# Patient Record
Sex: MALE | Race: WHITE | NOT HISPANIC OR LATINO | Employment: FULL TIME | URBAN - NONMETROPOLITAN AREA
[De-identification: names, ages, dates, MRNs, and addresses within clinical notes are randomized per-mention and may not be internally consistent; named-entity substitution may affect disease eponyms.]

---

## 2019-09-30 ENCOUNTER — OFFICE VISIT (OUTPATIENT)
Dept: FAMILY MEDICINE CLINIC | Facility: CLINIC | Age: 54
End: 2019-09-30
Payer: COMMERCIAL

## 2019-09-30 VITALS
HEIGHT: 75 IN | DIASTOLIC BLOOD PRESSURE: 84 MMHG | SYSTOLIC BLOOD PRESSURE: 134 MMHG | BODY MASS INDEX: 20.76 KG/M2 | WEIGHT: 167 LBS

## 2019-09-30 DIAGNOSIS — N52.9 VASCULOGENIC ERECTILE DYSFUNCTION, UNSPECIFIED VASCULOGENIC ERECTILE DYSFUNCTION TYPE: ICD-10-CM

## 2019-09-30 DIAGNOSIS — J01.00 ACUTE NON-RECURRENT MAXILLARY SINUSITIS: Primary | ICD-10-CM

## 2019-09-30 PROCEDURE — 99203 OFFICE O/P NEW LOW 30 MIN: CPT | Performed by: FAMILY MEDICINE

## 2019-09-30 RX ORDER — SILDENAFIL 50 MG/1
50 TABLET, FILM COATED ORAL DAILY PRN
Qty: 10 TABLET | Refills: 0 | Status: SHIPPED | OUTPATIENT
Start: 2019-09-30 | End: 2019-11-01 | Stop reason: SDUPTHER

## 2019-09-30 RX ORDER — AMOXICILLIN AND CLAVULANATE POTASSIUM 875; 125 MG/1; MG/1
1 TABLET, FILM COATED ORAL EVERY 12 HOURS SCHEDULED
Qty: 20 TABLET | Refills: 0 | Status: SHIPPED | OUTPATIENT
Start: 2019-09-30 | End: 2019-10-10

## 2019-09-30 NOTE — PATIENT INSTRUCTIONS
Discussed the problem with the congestion I feel has maxillary sinusitis and will place on 10 day course of Augmentin 875 twice a day  Should use saline nasal rinse at bedtime and in the morning    Discussed the idea of using generic Viagra for the erectile dysfunction and may try using 50 mg and I sent a prescription in for this but may also try taking DHEA 50 or 100 mg daily for 6-8 week trial   Should set up in the future for wellness check

## 2019-09-30 NOTE — PROGRESS NOTES
Assessment/Plan:      Diagnoses and all orders for this visit:    Acute non-recurrent maxillary sinusitis  Comments:  Should avoid smoking  Will place on 10 day course of Augmentin 875 b i d  And use saline nasal rinse twice a day  Orders:  -     amoxicillin-clavulanate (AUGMENTIN) 875-125 mg per tablet; Take 1 tablet by mouth every 12 (twelve) hours for 10 days    Vasculogenic erectile dysfunction, unspecified vasculogenic erectile dysfunction type  Comments:  Gave prescription for generic Viagra to use p r n  But should also use DHEA 50 or 100 mg daily for 6-8 week trial  Orders:  -     sildenafil (VIAGRA) 50 MG tablet; Take 1 tablet (50 mg total) by mouth daily as needed for erectile dysfunction          Subjective:     Patient ID: Huy Hogan is a 52 y o  male  Patient has history of rectal carcinoma is currently being followed for this but treatment has finished  Has 2 week history of cough and congestion now with discolored mucus and has been having trouble with erectile dysfunction only occasionally has erections in the morning      Review of Systems   HENT: Positive for congestion and rhinorrhea (Mucus is discolored)  Respiratory: Negative for chest tightness and wheezing  Gastrointestinal: Negative for abdominal pain  Endocrine: Negative for polyuria  Genitourinary: Negative for difficulty urinating  Neurological: Positive for light-headedness  Negative for dizziness and headaches  Hematological: Negative for adenopathy  Objective:     Physical Exam   Constitutional: He is oriented to person, place, and time  He appears well-developed  HENT:   Head: Normocephalic  Right Ear: Tympanic membrane, external ear and ear canal normal    Left Ear: Tympanic membrane, external ear and ear canal normal    Nose: Mucosal edema and rhinorrhea ( mucus is discolored) present  Mouth/Throat: Posterior oropharyngeal erythema present  Eyes: Conjunctivae are normal    Neck: Neck supple  No thyromegaly present  Cardiovascular: Normal rate, regular rhythm, normal heart sounds and intact distal pulses  No murmur (Rate is 78) heard  Pulmonary/Chest: Effort normal and breath sounds normal    Abdominal: Soft  He exhibits no distension and no mass  There is no tenderness  Musculoskeletal: He exhibits no edema  Lymphadenopathy:     He has no cervical adenopathy  Neurological: He is alert and oriented to person, place, and time  Skin: Skin is warm and dry  No rash noted  Psychiatric: He has a normal mood and affect  Nursing note and vitals reviewed

## 2019-10-01 PROBLEM — C20 RECTAL CARCINOMA (HCC): Chronic | Status: ACTIVE | Noted: 2019-10-01

## 2019-10-31 ENCOUNTER — OFFICE VISIT (OUTPATIENT)
Dept: FAMILY MEDICINE CLINIC | Facility: CLINIC | Age: 54
End: 2019-10-31
Payer: COMMERCIAL

## 2019-10-31 VITALS
SYSTOLIC BLOOD PRESSURE: 138 MMHG | HEIGHT: 75 IN | BODY MASS INDEX: 20.64 KG/M2 | WEIGHT: 166 LBS | DIASTOLIC BLOOD PRESSURE: 88 MMHG

## 2019-10-31 DIAGNOSIS — Z00.00 WELLNESS EXAMINATION: Primary | ICD-10-CM

## 2019-10-31 DIAGNOSIS — N52.9 VASCULOGENIC ERECTILE DYSFUNCTION, UNSPECIFIED VASCULOGENIC ERECTILE DYSFUNCTION TYPE: ICD-10-CM

## 2019-10-31 DIAGNOSIS — C20 RECTAL CARCINOMA (HCC): Chronic | ICD-10-CM

## 2019-10-31 PROCEDURE — 3008F BODY MASS INDEX DOCD: CPT | Performed by: FAMILY MEDICINE

## 2019-10-31 PROCEDURE — 99396 PREV VISIT EST AGE 40-64: CPT | Performed by: FAMILY MEDICINE

## 2019-10-31 PROCEDURE — 99406 BEHAV CHNG SMOKING 3-10 MIN: CPT | Performed by: FAMILY MEDICINE

## 2019-10-31 NOTE — PATIENT INSTRUCTIONS
Overall exam today looks stable I did discuss the smoking which is about 1 pack a day and needs to consider stopping and I advised him to try to every time he goes to light up a cigarette to ask himself due I really want this 1 and if not avoid that cigarette  If decides would like to pursue stopping may call and will recheck and consider use of medication to help    I did advise flu shot patient declined will check renal panel lipid panel and PSA

## 2019-10-31 NOTE — PROGRESS NOTES
[address]  [facility]    NAME: Jeanne Arias  AGE: 62 y o  SEX: male  : 1965     DATE: 2019     Assessment and Plan:     Problem List Items Addressed This Visit        Digestive    Rectal carcinoma ([address] ) (Chronic)     Reviewed history I feel has abnormalities related to his prior surgery and would be helped using Metamucil 1 tbsp daily with his bowel movements            Other    Vasculogenic erectile dysfunction (Chronic)     Overall doing better should continue the DHEA and use the generic Viagra p r n  Relevant Medications    sildenafil (VIAGRA) 50 MG tablet      Other Visit Diagnoses     Wellness examination    -  Primary    Relevant Orders    Renal function panel    Lipid panel    PSA, Total Screen          Immunizations and preventive care screenings were discussed with patient today  Appropriate education was printed on patient's after visit summary  Counseling:  · Needs to check in with dentist   I did discussed adjusting overall diet and needs to push for smoking cessation      Tobacco Cessation Counseling: Tobacco cessation counseling was provided  The patient is sincerely urged to quit consumption of tobacco  He is not ready to quit tobacco  Medication options discussed  Side effects of medication not discussed  Return in about 1 year (around 10/31/2020) for Annual physical      Chief Complaint:     Chief Complaint   Patient presents with    Annual Exam      History of Present Illness:     Adult Annual Physical   Patient here for a comprehensive physical exam  The patient reports no problems Tobacco Cessation Counseling: Tobacco cessation counseling and education was provided  The patient is sincerely urged to quit consumption of tobacco  He is not ready to quit tobacco  The numerous health risks of tobacco consumption were discussed   If he decides to quit, there are a number of helpful adjunctive aids, and he can see me to discuss nicotine replacement therapy, chantix, or bupropion anytime in the future  Gilberto Pipe 3-5 minutes of counseling    Diet and Physical Activity  · Diet/Nutrition: poor diet  · Exercise: walking  Depression Screening  PHQ-9 Depression Screening    PHQ-9:    Frequency of the following problems over the past two weeks:            General Health  · Sleep: sleeps well  · Hearing: decreased - bilateral   · Vision: no vision problems  · Dental: no dental visits for >1 year   Health  · Symptoms include: none     Review of Systems:     Review of Systems   Constitutional: Negative for activity change, appetite change, chills, fatigue, fever and unexpected weight change  HENT: Positive for dental problem  Negative for congestion, hearing loss, rhinorrhea, trouble swallowing and voice change  Eyes: Negative for visual disturbance  Respiratory: Negative for apnea, cough, chest tightness and shortness of breath  Cardiovascular: Negative for chest pain, palpitations and leg swelling  Gastrointestinal: Negative for abdominal distention, abdominal pain, constipation (But intermittently some difficulty in passing stool) and diarrhea  Endocrine: Negative for polyuria  Genitourinary: Negative for difficulty urinating ( does have problem with erectile dysfunction but is doing better using the DHEA and generic Viagra p r n )  Musculoskeletal: Positive for arthralgias  Negative for myalgias  Skin: Negative for rash  Allergic/Immunologic: Positive for environmental allergies  Neurological: Positive for numbness  Negative for dizziness, weakness, light-headedness and headaches  Hematological: Negative for adenopathy  Psychiatric/Behavioral: Negative for agitation        Past Medical History:     Past Medical History:   Diagnosis Date    [facility] ([facility] )     Tobacco user       Past Surgical History:     Past Surgical History:   Procedure Laterality Date    COLONOSCOPY  03/08/2017    Dr Jessica Savage      by Dr Regina Gomez  2017    RECTAL SURGERY  07/2017    Low anterior resection w/ileostomy at [facility]; March 2018 ileostomy reversal       Family History:     Family History   Problem Relation Age of Onset    No Known Problems Family       Social History:     Social History     Socioeconomic History    Marital status: /Civil Union     Spouse name: None    Number of children: None    Years of education: None    Highest education level: None   Occupational History    None   Social Needs    Financial resource strain: None    Food insecurity:     Worry: None     Inability: None    Transportation needs:     Medical: None     Non-medical: None   Tobacco Use    Smoking status: Current Every Day Smoker     Packs/day: 1 00     Years: 40 00     Pack years: 40 00     Types: Cigarettes    Smokeless tobacco: Never Used   Substance and Sexual Activity    Alcohol use: Yes     Frequency: 2-4 times a month    Drug use: Never    Sexual activity: None   Lifestyle    Physical activity:     Days per week: None     Minutes per session: None    Stress: None   Relationships    Social connections:     Talks on phone: None     Gets together: None     Attends Cheondo[employer] service: None     Active member of club or organization: None     Attends meetings of clubs or organizations: None     Relationship status: None    Intimate partner violence:     Fear of current or ex partner: None     Emotionally abused: None     Physically abused: None     Forced sexual activity: None   Other Topics Concern    None   Social History Narrative    None      Current Medications:     Current Outpatient Medications   Medication Sig Dispense Refill    sildenafil (VIAGRA) 50 MG tablet Take 1 tablet (50 mg total) by mouth daily as needed for erectile dysfunction 10 tablet 5     No current facility-administered medications for this visit         Allergies: No Known Allergies   Physical Exam:     /88 (BP Location: Left arm, Patient Position: Sitting, Cuff Size: Standard)   Ht 6' 3" (1 905 m)   Wt 75 3 kg (166 lb)   BMI 20 75 kg/m²     Physical Exam   Constitutional: He is oriented to person, place, and time  He appears well-developed and well-nourished  No distress  HENT:   Head: Normocephalic  Right Ear: Tympanic membrane, external ear and ear canal normal  Decreased hearing (Mixed hearing loss at 25 decibel screen no difficulty with conversation) is noted  Left Ear: Tympanic membrane, external ear and ear canal normal  Decreased hearing ( mixed hearing loss a 25 decibel screen) is noted  Mouth/Throat: Abnormal dentition ( multiple missing teeth)  Dental caries present  Eyes: Pupils are equal, round, and reactive to light  Neck: Normal range of motion  Neck supple  No thyromegaly present  Cardiovascular: Normal rate, regular rhythm, normal heart sounds and intact distal pulses  No murmur heard  Pulses:       Dorsalis pedis pulses are 2+ on the right side, and 2+ on the left side  Posterior tibial pulses are 2+ on the right side, and 2+ on the left side  Pulmonary/Chest: Effort normal and breath sounds normal    Abdominal: He exhibits no distension and no mass  There is no tenderness  Hernia confirmed negative in the right inguinal area and confirmed negative in the left inguinal area  Genitourinary: Testes normal and penis normal  Circumcised  Musculoskeletal: Normal range of motion  He exhibits tenderness  He exhibits no edema  Right foot: There is no deformity  Left foot: There is no deformity  Feet:   Right Foot:   Protective Sensation: 8 sites tested  8 sites sensed  Left Foot:   Protective Sensation: 8 sites tested  8 sites sensed  Lymphadenopathy:     He has no cervical adenopathy  Neurological: He is alert and oriented to person, place, and time  He displays normal reflexes   No cranial nerve deficit or sensory deficit  He exhibits normal muscle tone  Coordination normal    Skin: Skin is warm and dry  Capillary refill takes 2 to 3 seconds  No rash noted  Psychiatric: He has a normal mood and affect  His behavior is normal  Judgment and thought content normal    Nursing note and vitals reviewed        Wade Draper MD  [facility] [address] [place] [address]

## 2019-11-01 PROBLEM — N52.9 VASCULOGENIC ERECTILE DYSFUNCTION: Chronic | Status: ACTIVE | Noted: 2019-11-01

## 2019-11-01 RX ORDER — SILDENAFIL 50 MG/1
50 TABLET, FILM COATED ORAL DAILY PRN
Qty: 10 TABLET | Refills: 5 | Status: SHIPPED | OUTPATIENT
Start: 2019-11-01 | End: 2020-11-05 | Stop reason: SDUPTHER

## 2020-10-29 ENCOUNTER — TELEPHONE (OUTPATIENT)
Dept: FAMILY MEDICINE CLINIC | Facility: CLINIC | Age: 55
End: 2020-10-29

## 2020-11-05 ENCOUNTER — OFFICE VISIT (OUTPATIENT)
Dept: FAMILY MEDICINE CLINIC | Facility: CLINIC | Age: 55
End: 2020-11-05
Payer: COMMERCIAL

## 2020-11-05 VITALS
WEIGHT: 172 LBS | HEART RATE: 72 BPM | SYSTOLIC BLOOD PRESSURE: 120 MMHG | OXYGEN SATURATION: 97 % | DIASTOLIC BLOOD PRESSURE: 70 MMHG | HEIGHT: 75 IN | BODY MASS INDEX: 21.39 KG/M2

## 2020-11-05 DIAGNOSIS — Z23 NEEDS FLU SHOT: ICD-10-CM

## 2020-11-05 DIAGNOSIS — Z72.0 TOBACCO ABUSE: Chronic | ICD-10-CM

## 2020-11-05 DIAGNOSIS — N52.9 VASCULOGENIC ERECTILE DYSFUNCTION, UNSPECIFIED VASCULOGENIC ERECTILE DYSFUNCTION TYPE: ICD-10-CM

## 2020-11-05 DIAGNOSIS — C20 RECTAL CARCINOMA (HCC): Chronic | ICD-10-CM

## 2020-11-05 DIAGNOSIS — Z00.00 WELLNESS EXAMINATION: Primary | ICD-10-CM

## 2020-11-05 PROCEDURE — 4004F PT TOBACCO SCREEN RCVD TLK: CPT | Performed by: FAMILY MEDICINE

## 2020-11-05 PROCEDURE — 3008F BODY MASS INDEX DOCD: CPT | Performed by: FAMILY MEDICINE

## 2020-11-05 PROCEDURE — 90682 RIV4 VACC RECOMBINANT DNA IM: CPT | Performed by: FAMILY MEDICINE

## 2020-11-05 PROCEDURE — 3725F SCREEN DEPRESSION PERFORMED: CPT | Performed by: FAMILY MEDICINE

## 2020-11-05 PROCEDURE — 90471 IMMUNIZATION ADMIN: CPT | Performed by: FAMILY MEDICINE

## 2020-11-05 PROCEDURE — 99396 PREV VISIT EST AGE 40-64: CPT | Performed by: FAMILY MEDICINE

## 2020-11-05 RX ORDER — SILDENAFIL 50 MG/1
50 TABLET, FILM COATED ORAL DAILY PRN
Qty: 10 TABLET | Refills: 5 | Status: SHIPPED | OUTPATIENT
Start: 2020-11-05 | End: 2021-11-11 | Stop reason: SDUPTHER

## 2021-11-11 ENCOUNTER — OFFICE VISIT (OUTPATIENT)
Dept: FAMILY MEDICINE CLINIC | Facility: CLINIC | Age: 56
End: 2021-11-11
Payer: COMMERCIAL

## 2021-11-11 VITALS
DIASTOLIC BLOOD PRESSURE: 62 MMHG | WEIGHT: 163 LBS | SYSTOLIC BLOOD PRESSURE: 112 MMHG | HEIGHT: 75 IN | OXYGEN SATURATION: 97 % | BODY MASS INDEX: 20.27 KG/M2 | HEART RATE: 83 BPM

## 2021-11-11 DIAGNOSIS — C20 RECTAL CARCINOMA (HCC): ICD-10-CM

## 2021-11-11 DIAGNOSIS — N52.9 VASCULOGENIC ERECTILE DYSFUNCTION, UNSPECIFIED VASCULOGENIC ERECTILE DYSFUNCTION TYPE: ICD-10-CM

## 2021-11-11 DIAGNOSIS — Z23 NEEDS FLU SHOT: ICD-10-CM

## 2021-11-11 DIAGNOSIS — Z00.00 WELLNESS EXAMINATION: Primary | ICD-10-CM

## 2021-11-11 PROCEDURE — 99396 PREV VISIT EST AGE 40-64: CPT | Performed by: FAMILY MEDICINE

## 2021-11-11 PROCEDURE — 3725F SCREEN DEPRESSION PERFORMED: CPT | Performed by: FAMILY MEDICINE

## 2021-11-11 PROCEDURE — 4004F PT TOBACCO SCREEN RCVD TLK: CPT | Performed by: FAMILY MEDICINE

## 2021-11-11 PROCEDURE — 3008F BODY MASS INDEX DOCD: CPT | Performed by: FAMILY MEDICINE

## 2021-11-11 PROCEDURE — 90471 IMMUNIZATION ADMIN: CPT | Performed by: FAMILY MEDICINE

## 2021-11-11 PROCEDURE — 90682 RIV4 VACC RECOMBINANT DNA IM: CPT | Performed by: FAMILY MEDICINE

## 2021-11-11 RX ORDER — SILDENAFIL 50 MG/1
50 TABLET, FILM COATED ORAL DAILY PRN
Qty: 10 TABLET | Refills: 5 | Status: SHIPPED | OUTPATIENT
Start: 2021-11-11

## 2022-07-09 NOTE — ASSESSMENT & PLAN NOTE
Reviewed history I feel has abnormalities related to his prior surgery and would be helped using Metamucil 1 tbsp daily with his bowel movements Cigarettes

## 2022-11-17 ENCOUNTER — OFFICE VISIT (OUTPATIENT)
Dept: FAMILY MEDICINE CLINIC | Facility: CLINIC | Age: 57
End: 2022-11-17

## 2022-11-17 VITALS
OXYGEN SATURATION: 99 % | DIASTOLIC BLOOD PRESSURE: 70 MMHG | HEART RATE: 82 BPM | BODY MASS INDEX: 20.27 KG/M2 | HEIGHT: 75 IN | SYSTOLIC BLOOD PRESSURE: 120 MMHG | WEIGHT: 163 LBS

## 2022-11-17 DIAGNOSIS — Z23 NEEDS FLU SHOT: ICD-10-CM

## 2022-11-17 DIAGNOSIS — Z72.0 TOBACCO ABUSE: Chronic | ICD-10-CM

## 2022-11-17 DIAGNOSIS — Z00.00 WELLNESS EXAMINATION: Primary | ICD-10-CM

## 2022-11-17 DIAGNOSIS — N52.9 VASCULOGENIC ERECTILE DYSFUNCTION, UNSPECIFIED VASCULOGENIC ERECTILE DYSFUNCTION TYPE: ICD-10-CM

## 2022-11-17 DIAGNOSIS — C20 RECTAL CARCINOMA (HCC): ICD-10-CM

## 2022-11-17 RX ORDER — SILDENAFIL 50 MG/1
50 TABLET, FILM COATED ORAL DAILY PRN
Qty: 10 TABLET | Refills: 5 | Status: SHIPPED | OUTPATIENT
Start: 2022-11-17

## 2022-11-17 NOTE — PROGRESS NOTES
ADULT ANNUAL [address] [facility] PRIMARY CARE    NAME: Lanette Romo  AGE: 59 y o  SEX: male  : 1965     DATE: 2022     Assessment and Plan:     Problem List Items Addressed This Visit        Digestive    Rectal carcinoma ([address] ) (Chronic)     Continue overall follow-up but seems to be doing well            Other    Tobacco abuse (Chronic)     Is going to work on smoking cessation         Vasculogenic erectile dysfunction (Chronic)     Gave refill for medication  Relevant Medications    sildenafil (VIAGRA) 50 MG tablet   Other Visit Diagnoses     Wellness examination    -  Primary    Needs flu shot        Relevant Orders    influenza vaccine, quadrivalent, recombinant, PF, 0 5 mL, for patients 18 yr+ (FLUBLOK) (Completed)          Immunizations and preventive care screenings were discussed with patient today  Appropriate education was printed on patient's after visit summary  Discussed risks and benefits of prostate cancer screening  We discussed the controversial history of PSA screening for prostate cancer in the [place] as well as the risk of over detection and over treatment of prostate cancer by way of PSA screening  The patient understands that PSA blood testing is an imperfect way to screen for prostate cancer and that elevated PSA levels in the blood may also be caused by infection, inflammation, prostatic trauma or manipulation, urological procedures, or by benign prostatic enlargement  The role of the digital rectal examination in prostate cancer screening was also discussed and I discussed with him that there is large interobserver variability in the findings of digital rectal examination  Counseling:  Dental Health: discussed importance of regular tooth brushing, flossing, and dental visits    · Push good nutrition      Depression Screening and Follow-up Plan: Patient was screened for depression during today's encounter  They screened negative with a PHQ-2 score of 0  Tobacco Cessation Counseling: Tobacco cessation counseling was provided  The patient is sincerely urged to quit consumption of tobacco  He is ready to quit tobacco  Medication options discussed  Nicotine patch was prescribed  Return in about 1 year (around 11/17/2023) for Annual physical      Chief Complaint:     Chief Complaint   Patient presents with   • Physical Exam      History of Present Illness:     Adult Annual Physical   Patient here for a comprehensive physical exam  The patient reports no problems  Diet and Physical Activity  · Diet/Nutrition: poor diet  · Exercise: no formal exercise  Depression Screening  PHQ-2/9 Depression Screening    Little interest or pleasure in doing things: 0 - not at all  Feeling down, depressed, or hopeless: 0 - not at all  PHQ-2 Score: 0  PHQ-2 Interpretation: Negative depression screen       General Health  · Sleep: sleeps well  · Hearing: decreased - bilateral   · Vision: goes for regular eye exams  · Dental: no dental visits for >1 year   Health  · Symptoms include: erectile dysfunction     Review of Systems:     Review of Systems   Constitutional: Negative for activity change, appetite change, chills, fatigue, fever and unexpected weight change  HENT: Positive for congestion (Mucus is clear) and dental problem  Negative for hearing loss, rhinorrhea and trouble swallowing  Eyes: Negative for visual disturbance  Respiratory: Negative for apnea, cough, chest tightness and shortness of breath  Cardiovascular: Negative for chest pain, palpitations and leg swelling  Gastrointestinal: Negative for abdominal distention, abdominal pain, constipation and diarrhea  Endocrine: Negative for polyuria ( nocturia times 0-1)  Genitourinary: Negative for difficulty urinating  Musculoskeletal: Negative for arthralgias and myalgias  Skin: Negative for rash  Allergic/Immunologic: Negative for environmental allergies  Neurological: Negative for dizziness, weakness, light-headedness, numbness and headaches  Hematological: Negative for adenopathy  Psychiatric/Behavioral: Negative for agitation and sleep disturbance  Past Medical History:     Past Medical History:   Diagnosis Date   • Cancer ([facility] [address] ) 3/2017    Rectal cancer   • [facility]    • Tobacco user       Past Surgical History:     Past Surgical History:   Procedure Laterality Date   • COLON SURGERY  8/2017   • COLONOSCOPY  03/08/2017    Dr Yo January    • Arthuro Gallop      by Dr Allyssa Sow    • [id number]  2017   • RECTAL SURGERY  07/2017    Low anterior resection w/ileostomy at [facility]; March 2018 ileostomy reversal       Family History:     Family History   Problem Relation Age of Onset   • No Known Problems Family       Social History:     Social History     Socioeconomic History   • Marital status: /Civil Union     Spouse name: None   • Number of children: None   • Years of education: None   • Highest education level: None   Occupational History   • None   Tobacco Use   • Smoking status: Every Day     Packs/day: 1 00     Years: 40 00     Pack years: 40 00     Types: Cigarettes   • Smokeless tobacco: Never   Vaping Use   • Vaping Use: Never used   Substance and Sexual Activity   • Alcohol use:  Yes     Alcohol/week: 8 0 standard drinks     Types: 8 Cans of beer per week   • Drug use: Never   • Sexual activity: Yes     Partners: Female     Birth control/protection: None   Other Topics Concern   • None   Social History Narrative   • None     Social Determinants of Health     Financial Resource Strain: Not on file   Food Insecurity: Not on file   Transportation Needs: Not on file   Physical Activity: Not on file   Stress: Not on file   Social Connections: Not on file   Intimate Partner Violence: Not on file   Housing Stability: Not on file      Current Medications:     Current Outpatient Medications   Medication Sig Dispense Refill   • sildenafil (VIAGRA) 50 MG tablet Take 1 tablet (50 mg total) by mouth daily as needed for erectile dysfunction 10 tablet 5     No current facility-administered medications for this visit  Allergies:     No Known Allergies   Physical Exam:     /70 (BP Location: Left arm, Patient Position: Sitting, Cuff Size: Standard)   Pulse 82   Ht 6' 3" (1 905 m)   Wt 73 9 kg (163 lb)   SpO2 99%   BMI 20 37 kg/m²     Physical Exam  Vitals and nursing note reviewed  Constitutional:       Appearance: Normal appearance  HENT:      Head: Normocephalic  Right Ear: Tympanic membrane, ear canal and external ear normal  Decreased hearing (Twenty-five decibel hearing screen off at 4000 hertz  No difficulty with conversation) noted  Left Ear: Tympanic membrane, ear canal and external ear normal  Decreased hearing ( 25 decibel hearing screen off at 4000 hertz) noted  Mouth/Throat:      Mouth: Mucous membranes are moist       Dentition: Abnormal dentition ( multiple missing teeth)  Dental caries ( multiple dental caries) present  Eyes:      Extraocular Movements: Extraocular movements intact  Conjunctiva/sclera: Conjunctivae normal       Pupils: Pupils are equal, round, and reactive to light  Neck:      Vascular: No carotid bruit  Cardiovascular:      Rate and Rhythm: Normal rate and regular rhythm  Pulses:           Dorsalis pedis pulses are 2+ on the right side and 1+ on the left side  Posterior tibial pulses are 1+ on the right side and 1+ on the left side  Heart sounds: Normal heart sounds  No murmur heard  Pulmonary:      Effort: Pulmonary effort is normal       Breath sounds: Normal breath sounds  Abdominal:      General: Abdomen is flat  There is no distension  Palpations: Abdomen is soft  There is no mass  Tenderness: There is no abdominal tenderness        Hernia: There is no hernia in the left inguinal area or right inguinal area  Genitourinary:     Penis: Normal and circumcised  Testes: Normal    Musculoskeletal:         General: No swelling  Cervical back: Normal range of motion and neck supple  No muscular tenderness  Right lower leg: No edema  Left lower leg: No edema  Right foot: Deformity present  Left foot: Deformity present  Feet:      Right foot:      Protective Sensation: 8 sites tested  8 sites sensed  Skin integrity: Skin integrity normal       Left foot:      Protective Sensation: 8 sites tested  8 sites sensed  Lymphadenopathy:      Cervical: No cervical adenopathy  Skin:     General: Skin is warm and dry  Findings: No rash  Neurological:      General: No focal deficit present  Mental Status: He is alert and oriented to person, place, and time  Cranial Nerves: No cranial nerve deficit  Sensory: No sensory deficit  Motor: No weakness        Coordination: Coordination normal       Gait: Gait normal       Deep Tendon Reflexes: Reflexes abnormal    Psychiatric:         Mood and Affect: Mood normal           Ashleigh Hill MD  [place] [id number]

## 2022-11-17 NOTE — PATIENT INSTRUCTIONS
Overall exam today looks stable  Flu shot given today  Does show some decreased kidney function although it has been stable over the last 2 years and will not repeat labs at this time  Did recommend a COVID booster  Does continue follow-up with Oncology for the rectal carcinoma

## 2023-09-14 ENCOUNTER — DOCTOR'S OFFICE (OUTPATIENT)
Dept: URBAN - NONMETROPOLITAN AREA CLINIC 1 | Facility: CLINIC | Age: 58
Setting detail: OPHTHALMOLOGY
End: 2023-09-14
Payer: COMMERCIAL

## 2023-09-14 DIAGNOSIS — T15.02XA: ICD-10-CM

## 2023-09-14 DIAGNOSIS — H18.20: ICD-10-CM

## 2023-09-14 PROCEDURE — 99203 OFFICE O/P NEW LOW 30 MIN: CPT

## 2023-09-14 ASSESSMENT — REFRACTION_CURRENTRX
OS_OVR_VA: 20/
OD_AXIS: 145
OD_OVR_VA: 20/
OS_CYLINDER: -0.25
OD_SPHERE: -1.00
OD_CYLINDER: -0.50
OS_SPHERE: -1.00
OS_AXIS: 076

## 2023-09-14 ASSESSMENT — VISUAL ACUITY
OD_BCVA: 20/400
OS_BCVA: 20/50+2

## 2023-09-14 ASSESSMENT — CONFRONTATIONAL VISUAL FIELD TEST (CVF)
OS_FINDINGS: FULL
OD_FINDINGS: FULL

## 2023-09-14 ASSESSMENT — CORNEAL EDEMA - FOLDS/STRIAE: OS_FOLDSSTRIAE: 3+

## 2023-09-14 ASSESSMENT — CORNEAL TRAUMA - FOREIGN BODY: OS_FOREIGNBODY: METALLIC W/RUST RING

## 2023-09-15 ENCOUNTER — DOCTOR'S OFFICE (OUTPATIENT)
Dept: URBAN - NONMETROPOLITAN AREA CLINIC 1 | Facility: CLINIC | Age: 58
Setting detail: OPHTHALMOLOGY
End: 2023-09-15
Payer: COMMERCIAL

## 2023-09-15 ENCOUNTER — RX ONLY (RX ONLY)
Age: 58
End: 2023-09-15

## 2023-09-15 DIAGNOSIS — H18.20: ICD-10-CM

## 2023-09-15 DIAGNOSIS — T15.02XD: ICD-10-CM

## 2023-09-15 PROCEDURE — 92012 INTRM OPH EXAM EST PATIENT: CPT

## 2023-09-15 PROCEDURE — 65220 REMOVE FOREIGN BODY FROM EYE: CPT

## 2023-09-15 ASSESSMENT — REFRACTION_CURRENTRX
OD_OVR_VA: 20/
OD_AXIS: 145
OS_OVR_VA: 20/
OD_SPHERE: -1.00
OS_CYLINDER: -0.25
OD_CYLINDER: -0.50
OS_SPHERE: -1.00
OS_AXIS: 076

## 2023-09-15 ASSESSMENT — CORNEAL EDEMA - FOLDS/STRIAE: OS_FOLDSSTRIAE: 3+

## 2023-09-15 ASSESSMENT — CONFRONTATIONAL VISUAL FIELD TEST (CVF)
OS_FINDINGS: FULL
OD_FINDINGS: FULL

## 2023-09-15 ASSESSMENT — CORNEAL TRAUMA - FOREIGN BODY: OS_FOREIGNBODY: METALLIC W/RUST RING

## 2023-09-15 ASSESSMENT — VISUAL ACUITY
OS_BCVA: 20/30
OD_BCVA: 20/400

## 2023-09-18 ENCOUNTER — RX ONLY (RX ONLY)
Age: 58
End: 2023-09-18

## 2023-09-18 ENCOUNTER — DOCTOR'S OFFICE (OUTPATIENT)
Dept: URBAN - NONMETROPOLITAN AREA CLINIC 1 | Facility: CLINIC | Age: 58
Setting detail: OPHTHALMOLOGY
End: 2023-09-18
Payer: COMMERCIAL

## 2023-09-18 DIAGNOSIS — T15.02XD: ICD-10-CM

## 2023-09-18 DIAGNOSIS — H18.20: ICD-10-CM

## 2023-09-18 PROCEDURE — 92012 INTRM OPH EXAM EST PATIENT: CPT | Performed by: OPTOMETRIST

## 2023-09-18 ASSESSMENT — TONOMETRY: OS_IOP_MMHG: 16

## 2023-09-18 ASSESSMENT — CONFRONTATIONAL VISUAL FIELD TEST (CVF)
OD_FINDINGS: FULL
OS_FINDINGS: FULL

## 2023-09-18 ASSESSMENT — CORNEAL TRAUMA - ABRASION: OS_ABRASION: PRESENT

## 2023-09-19 ASSESSMENT — SPHEQUIV_DERIVED: OD_SPHEQUIV: -0.25

## 2023-09-19 ASSESSMENT — REFRACTION_CURRENTRX
OD_SPHERE: -1.00
OD_OVR_VA: 20/
OS_AXIS: 076
OS_OVR_VA: 20/
OS_SPHERE: -1.00
OD_CYLINDER: -0.50
OS_CYLINDER: -0.25
OD_AXIS: 145
OS_VPRISM_DIRECTION: SV
OD_VPRISM_DIRECTION: SV

## 2023-09-19 ASSESSMENT — REFRACTION_AUTOREFRACTION
OD_SPHERE: +0.25
OD_CYLINDER: -1.00
OD_AXIS: 104

## 2023-09-19 ASSESSMENT — VISUAL ACUITY
OD_BCVA: 20/150
OS_BCVA: 20/40+1

## 2023-09-21 ENCOUNTER — DOCTOR'S OFFICE (OUTPATIENT)
Dept: URBAN - NONMETROPOLITAN AREA CLINIC 1 | Facility: CLINIC | Age: 58
Setting detail: OPHTHALMOLOGY
End: 2023-09-21
Payer: COMMERCIAL

## 2023-09-21 DIAGNOSIS — T15.02XD: ICD-10-CM

## 2023-09-21 DIAGNOSIS — H18.20: ICD-10-CM

## 2023-09-21 PROCEDURE — 92012 INTRM OPH EXAM EST PATIENT: CPT

## 2023-09-21 ASSESSMENT — REFRACTION_AUTOREFRACTION
OD_AXIS: 114
OD_CYLINDER: -1.25
OS_AXIS: 017
OS_SPHERE: -0.25
OD_SPHERE: +0.50
OS_CYLINDER: -1.25

## 2023-09-21 ASSESSMENT — CONFRONTATIONAL VISUAL FIELD TEST (CVF)
OS_FINDINGS: FULL
OD_FINDINGS: FULL

## 2023-09-21 ASSESSMENT — AXIALLENGTH_DERIVED
OS_AL: 24.7693
OD_AL: 24.6502

## 2023-09-21 ASSESSMENT — REFRACTION_CURRENTRX
OD_VPRISM_DIRECTION: SV
OS_SPHERE: -1.00
OS_VPRISM_DIRECTION: SV
OD_SPHERE: -1.00
OS_CYLINDER: -0.25
OD_OVR_VA: 20/
OD_CYLINDER: -0.50
OS_OVR_VA: 20/
OS_AXIS: 078
OD_AXIS: 138

## 2023-09-21 ASSESSMENT — VISUAL ACUITY
OD_BCVA: 20/30-1
OS_BCVA: 20/30

## 2023-09-21 ASSESSMENT — KERATOMETRY
OD_K2POWER_DIOPTERS: 41.25
OS_K2POWER_DIOPTERS: 42.00
OD_AXISANGLE_DEGREES: 031
OS_AXISANGLE_DEGREES: 117
OS_K1POWER_DIOPTERS: 40.75
OD_K1POWER_DIOPTERS: 40.50

## 2023-09-21 ASSESSMENT — SPHEQUIV_DERIVED
OS_SPHEQUIV: -0.875
OD_SPHEQUIV: -0.125

## 2023-09-26 ENCOUNTER — DOCTOR'S OFFICE (OUTPATIENT)
Dept: URBAN - NONMETROPOLITAN AREA CLINIC 1 | Facility: CLINIC | Age: 58
Setting detail: OPHTHALMOLOGY
End: 2023-09-26
Payer: COMMERCIAL

## 2023-09-26 ENCOUNTER — RX ONLY (RX ONLY)
Age: 58
End: 2023-09-26

## 2023-09-26 DIAGNOSIS — T15.02XD: ICD-10-CM

## 2023-09-26 PROBLEM — H18.20 CORNEAL EDEMA, UNSPECIFIED: Status: RESOLVED | Noted: 2023-09-14 | Resolved: 2023-09-26

## 2023-09-26 PROCEDURE — 92012 INTRM OPH EXAM EST PATIENT: CPT

## 2023-09-26 ASSESSMENT — REFRACTION_AUTOREFRACTION
OD_CYLINDER: -0.75
OS_AXIS: 152
OS_SPHERE: 0.00
OD_AXIS: 111
OD_SPHERE: 0.00
OS_CYLINDER: -1.50

## 2023-09-26 ASSESSMENT — KERATOMETRY
OS_AXISANGLE_DEGREES: 117
OD_AXISANGLE_DEGREES: 031
OD_K2POWER_DIOPTERS: 41.25
OS_K2POWER_DIOPTERS: 42.00
OS_K1POWER_DIOPTERS: 40.75
OD_K1POWER_DIOPTERS: 40.50

## 2023-09-26 ASSESSMENT — AXIALLENGTH_DERIVED
OS_AL: 24.7157
OD_AL: 24.757

## 2023-09-26 ASSESSMENT — VISUAL ACUITY
OS_BCVA: 20/30+2
OD_BCVA: 20/25-2

## 2023-09-26 ASSESSMENT — CONFRONTATIONAL VISUAL FIELD TEST (CVF)
OD_FINDINGS: FULL
OS_FINDINGS: FULL

## 2023-09-26 ASSESSMENT — REFRACTION_CURRENTRX
OD_VPRISM_DIRECTION: SV
OS_CYLINDER: -0.25
OS_OVR_VA: 20/
OS_VPRISM_DIRECTION: SV
OD_AXIS: 137
OS_SPHERE: -1.00
OS_AXIS: 078
OD_SPHERE: -1.00
OD_CYLINDER: -0.50
OD_OVR_VA: 20/

## 2023-09-26 ASSESSMENT — SPHEQUIV_DERIVED
OS_SPHEQUIV: -0.75
OD_SPHEQUIV: -0.375

## 2023-10-03 ENCOUNTER — DOCTOR'S OFFICE (OUTPATIENT)
Dept: URBAN - NONMETROPOLITAN AREA CLINIC 1 | Facility: CLINIC | Age: 58
Setting detail: OPHTHALMOLOGY
End: 2023-10-03
Payer: COMMERCIAL

## 2023-10-03 DIAGNOSIS — H17.9: ICD-10-CM

## 2023-10-03 DIAGNOSIS — T15.02XD: ICD-10-CM

## 2023-10-03 PROCEDURE — 92012 INTRM OPH EXAM EST PATIENT: CPT

## 2023-10-03 ASSESSMENT — REFRACTION_AUTOREFRACTION
OD_CYLINDER: -1.25
OD_SPHERE: +0.50
OS_SPHERE: +2.25
OS_CYLINDER: -2.25
OD_AXIS: 092
OS_AXIS: 089

## 2023-10-03 ASSESSMENT — CONFRONTATIONAL VISUAL FIELD TEST (CVF)
OD_FINDINGS: FULL
OS_FINDINGS: FULL

## 2023-10-03 ASSESSMENT — KERATOMETRY
OD_AXISANGLE_DEGREES: 031
OD_K2POWER_DIOPTERS: 41.25
OS_AXISANGLE_DEGREES: 117
OS_K2POWER_DIOPTERS: 42.00
OD_K1POWER_DIOPTERS: 40.50
OS_K1POWER_DIOPTERS: 40.75

## 2023-10-03 ASSESSMENT — REFRACTION_CURRENTRX
OD_CYLINDER: -0.50
OS_SPHERE: -1.00
OS_CYLINDER: -0.25
OD_SPHERE: -1.00
OD_OVR_VA: 20/
OD_AXIS: 137
OS_AXIS: 078
OD_VPRISM_DIRECTION: SV
OS_VPRISM_DIRECTION: SV
OS_OVR_VA: 20/

## 2023-10-03 ASSESSMENT — AXIALLENGTH_DERIVED
OS_AL: 23.9392
OD_AL: 24.6502

## 2023-10-03 ASSESSMENT — VISUAL ACUITY
OD_BCVA: 20/25-2
OS_BCVA: 20/30+2

## 2023-10-03 ASSESSMENT — SPHEQUIV_DERIVED
OS_SPHEQUIV: 1.125
OD_SPHEQUIV: -0.125

## 2023-11-03 ENCOUNTER — DOCTOR'S OFFICE (OUTPATIENT)
Dept: URBAN - NONMETROPOLITAN AREA CLINIC 1 | Facility: CLINIC | Age: 58
Setting detail: OPHTHALMOLOGY
End: 2023-11-03
Payer: COMMERCIAL

## 2023-11-03 DIAGNOSIS — H17.9: ICD-10-CM

## 2023-11-03 DIAGNOSIS — T15.02XD: ICD-10-CM

## 2023-11-03 PROCEDURE — 92012 INTRM OPH EXAM EST PATIENT: CPT

## 2023-11-03 ASSESSMENT — REFRACTION_CURRENTRX
OS_AXIS: 078
OS_SPHERE: -1.00
OS_VPRISM_DIRECTION: SV
OS_CYLINDER: -0.25
OD_OVR_VA: 20/
OD_CYLINDER: -0.50
OD_VPRISM_DIRECTION: SV
OD_SPHERE: -1.00
OD_AXIS: 137
OS_OVR_VA: 20/

## 2023-11-03 ASSESSMENT — KERATOMETRY
OD_AXISANGLE_DEGREES: 031
OD_K1POWER_DIOPTERS: 40.50
OD_K2POWER_DIOPTERS: 41.25
OS_K2POWER_DIOPTERS: 42.00
OS_K1POWER_DIOPTERS: 40.75
OS_AXISANGLE_DEGREES: 117

## 2023-11-03 ASSESSMENT — VISUAL ACUITY
OS_BCVA: 20/25+2
OD_BCVA: 20/25-1

## 2023-11-03 ASSESSMENT — REFRACTION_AUTOREFRACTION
OS_CYLINDER: -2.25
OD_AXIS: 092
OS_SPHERE: +2.25
OS_AXIS: 089
OD_CYLINDER: -1.25
OD_SPHERE: +0.50

## 2023-11-03 ASSESSMENT — AXIALLENGTH_DERIVED
OS_AL: 23.9392
OD_AL: 24.6502

## 2023-11-03 ASSESSMENT — CONFRONTATIONAL VISUAL FIELD TEST (CVF)
OS_FINDINGS: FULL
OD_FINDINGS: FULL

## 2023-11-03 ASSESSMENT — SPHEQUIV_DERIVED
OS_SPHEQUIV: 1.125
OD_SPHEQUIV: -0.125

## 2023-11-30 ENCOUNTER — OFFICE VISIT (OUTPATIENT)
Dept: FAMILY MEDICINE CLINIC | Facility: CLINIC | Age: 58
End: 2023-11-30
Payer: COMMERCIAL

## 2023-11-30 VITALS
BODY MASS INDEX: 20.49 KG/M2 | HEART RATE: 66 BPM | WEIGHT: 164.8 LBS | OXYGEN SATURATION: 90 % | HEIGHT: 75 IN | DIASTOLIC BLOOD PRESSURE: 82 MMHG | SYSTOLIC BLOOD PRESSURE: 130 MMHG

## 2023-11-30 DIAGNOSIS — C20 RECTAL CARCINOMA (HCC): ICD-10-CM

## 2023-11-30 DIAGNOSIS — N52.9 VASCULOGENIC ERECTILE DYSFUNCTION, UNSPECIFIED VASCULOGENIC ERECTILE DYSFUNCTION TYPE: ICD-10-CM

## 2023-11-30 DIAGNOSIS — N18.31 CHRONIC RENAL FAILURE, STAGE 3A (HCC): ICD-10-CM

## 2023-11-30 DIAGNOSIS — Z00.00 WELLNESS EXAMINATION: Primary | ICD-10-CM

## 2023-11-30 DIAGNOSIS — Z72.0 TOBACCO ABUSE: Chronic | ICD-10-CM

## 2023-11-30 DIAGNOSIS — Z23 ENCOUNTER FOR IMMUNIZATION: ICD-10-CM

## 2023-11-30 PROCEDURE — 90686 IIV4 VACC NO PRSV 0.5 ML IM: CPT | Performed by: FAMILY MEDICINE

## 2023-11-30 PROCEDURE — 99396 PREV VISIT EST AGE 40-64: CPT | Performed by: FAMILY MEDICINE

## 2023-11-30 PROCEDURE — 90471 IMMUNIZATION ADMIN: CPT | Performed by: FAMILY MEDICINE

## 2023-11-30 RX ORDER — SILDENAFIL 50 MG/1
50 TABLET, FILM COATED ORAL DAILY PRN
Qty: 10 TABLET | Refills: 5 | Status: SHIPPED | OUTPATIENT
Start: 2023-11-30

## 2023-11-30 NOTE — PATIENT INSTRUCTIONS
Overall exam today looks good. I do feel she check in with the dentist.  Will check renal panel to monitor the kidney function. Will give flu shot today.

## 2023-11-30 NOTE — PROGRESS NOTES
ADULT ANNUAL [facility] PRIMARY CARE    NAME: Sherren Living  AGE: 59 y.o. SEX: male  : 1965     DATE: 2023     Assessment and Plan:     Problem List Items Addressed This Visit          Digestive    Rectal carcinoma ([address]) (Chronic)     Continue overall follow-up            Other    Tobacco abuse (Chronic)     Encouraged to work for smoking cessation         Vasculogenic erectile dysfunction (Chronic)     Gave refill for the Viagra         Relevant Medications    sildenafil (VIAGRA) 50 MG tablet     Other Visit Diagnoses       Wellness examination    -  Primary    Chronic renal failure, stage 3a ([address])        Relevant Orders    Renal function panel    Encounter for immunization        Relevant Orders    influenza vaccine, quadrivalent, 0.5 mL, preservative-free, for adult and pediatric patients 6 mos+ (AFLURIA, FLUARIX, FLULAVAL, FLUZONE) (Completed)            Immunizations and preventive care screenings were discussed with patient today. Appropriate education was printed on patient's after visit summary. Counseling:  Dental Health: discussed importance of regular tooth brushing, flossing, and dental visits. Exercise: the importance of regular exercise/physical activity was discussed. Recommend exercise 3-5 times per week for at least 30 minutes. Depression Screening and Follow-up Plan: Patient was screened for depression during today's encounter. They screened negative with a PHQ-2 score of 0. Tobacco Cessation Counseling: Tobacco cessation counseling was provided. The patient is sincerely urged to quit consumption of tobacco. He is not ready to quit tobacco. Medication options not discussed.          Return in about 1 year (around 2024) for Annual physical.     Chief Complaint:     Chief Complaint   Patient presents with    Physical Exam      History of Present Illness:     Adult Annual Physical   Patient here for a comprehensive physical exam. The patient reports no problems. Diet and Physical Activity  Diet/Nutrition: well balanced diet. Exercise: no formal exercise. Depression Screening  PHQ-2/9 Depression Screening    Little interest or pleasure in doing things: 0 - not at all  Feeling down, depressed, or hopeless: 0 - not at all  PHQ-2 Score: 0  PHQ-2 Interpretation: Negative depression screen       General Health  Sleep: sleeps well. Hearing: decreased - bilateral.  Vision: goes for regular eye exams. Dental: no dental visits for >1 year and needs to follow-up for dental care .  Health  Symptoms include: erectile dysfunction    Advanced Care Planning  Do you have an advanced directive? no  Do you have a durable medical power of ? no     Review of Systems:     Review of Systems   Constitutional:  Negative for activity change, appetite change, chills, fatigue, fever and unexpected weight change. HENT:  Positive for dental problem. Negative for congestion, hearing loss, rhinorrhea, trouble swallowing and voice change. Eyes:  Negative for visual disturbance. Respiratory:  Negative for apnea, cough, chest tightness and shortness of breath. Cardiovascular:  Negative for chest pain, palpitations and leg swelling. Gastrointestinal:  Negative for abdominal distention, abdominal pain, constipation and diarrhea. Endocrine: Negative for polyuria (Nocturia x 1). Genitourinary:  Negative for difficulty urinating. Musculoskeletal:  Negative for arthralgias and myalgias. Skin:  Negative for rash. Allergic/Immunologic: Positive for environmental allergies. Neurological:  Negative for dizziness, weakness, light-headedness, numbness and headaches. Hematological:  Negative for adenopathy. Psychiatric/Behavioral:  Negative for agitation and sleep disturbance.        Past Medical History:     Past Medical History:   Diagnosis Date    Cancer ([address] [facility]) 3/2017    Rectal cancer Rectal cancer ([address] [facility])     Tobacco user       Past Surgical History:     Past Surgical History:   Procedure Laterality Date    COLON SURGERY  8/2017    COLONOSCOPY  03/08/2017    Dr. Sanchez Comes      by Dr. Stone Velazquez  2017    RECTAL SURGERY  07/2017    Low anterior resection w/ileostomy at [facility]; March 2018 ileostomy reversal       Family History:     Family History   Problem Relation Age of Onset    No Known Problems Family       Social History:     Social History     Socioeconomic History    Marital status: /Civil Union     Spouse name: None    Number of children: None    Years of education: None    Highest education level: None   Occupational History    None   Tobacco Use    Smoking status: Every Day     Packs/day: 1.00     Years: 40.00     Total pack years: 40.00     Types: Cigarettes    Smokeless tobacco: Never   Vaping Use    Vaping Use: Never used   Substance and Sexual Activity    Alcohol use: Yes     Alcohol/week: 8.0 standard drinks of alcohol     Types: 8 Cans of beer per week    Drug use: Never    Sexual activity: Yes     Partners: Female     Birth control/protection: None   Other Topics Concern    None   Social History Narrative    None     Social Determinants of Health     Financial Resource Strain: Not on file   Food Insecurity: Not on file   Transportation Needs: Not on file   Physical Activity: Not on file   Stress: Not on file   Social Connections: Not on file   Intimate Partner Violence: Not on file   Housing Stability: Not on file      Current Medications:     Current Outpatient Medications   Medication Sig Dispense Refill    sildenafil (VIAGRA) 50 MG tablet Take 1 tablet (50 mg total) by mouth daily as needed for erectile dysfunction 10 tablet 5     No current facility-administered medications for this visit.       Allergies:     No Known Allergies   Physical Exam:     Blood Pressure 130/82 (BP Location: Left arm, Patient Position: Sitting, Cuff Size: Large)   Pulse 66   Height 6' 3" (1.905 m)   Weight 74.8 kg (164 lb 12.8 oz)   Oxygen Saturation 90%   Body Mass Index 20.60 kg/m²     Physical Exam  Vitals and nursing note reviewed. Constitutional:       Appearance: Normal appearance. HENT:      Head: Normocephalic. Right Ear: Tympanic membrane, ear canal and external ear normal. Decreased hearing (25 dB hearing screen off at 4000 Hz. No difficulty with conversation) noted. Left Ear: Tympanic membrane, ear canal and external ear normal. Decreased hearing (25 dB hearing screen off at 4000 Hz) noted. Nose: Nose normal.      Mouth/Throat:      Mouth: Mucous membranes are moist.      Dentition: Abnormal dentition (Multiple missing teeth). Dental caries present. Eyes:      Extraocular Movements: Extraocular movements intact. Conjunctiva/sclera: Conjunctivae normal.      Pupils: Pupils are equal, round, and reactive to light. Neck:      Vascular: No carotid bruit. Cardiovascular:      Rate and Rhythm: Normal rate and regular rhythm. Pulses:           Dorsalis pedis pulses are 2+ on the right side and 1+ on the left side. Posterior tibial pulses are 1+ on the right side and 1+ on the left side. Heart sounds: Normal heart sounds. No murmur (Rate is 78) heard. Pulmonary:      Effort: Pulmonary effort is normal.      Breath sounds: Normal breath sounds. Abdominal:      General: Abdomen is flat. There is no distension. Palpations: Abdomen is soft. There is no mass. Tenderness: There is no abdominal tenderness. Hernia: There is no hernia in the left inguinal area or right inguinal area. Genitourinary:     Penis: Normal and circumcised. Testes: Normal.   Musculoskeletal:         General: No swelling. Cervical back: Normal range of motion and neck supple. No tenderness. No muscular tenderness. Right lower leg: No edema. Left lower leg: No edema. Right foot: No deformity.    Feet: Right foot:      Protective Sensation: 8 sites tested. 8 sites sensed. Skin integrity: Skin integrity normal.      Left foot:      Protective Sensation: 8 sites tested. 8 sites sensed. Skin integrity: Skin integrity normal.   Lymphadenopathy:      Cervical: No cervical adenopathy. Skin:     General: Skin is warm and dry. Capillary Refill: Capillary refill takes 2 to 3 seconds. Findings: No rash (Does have extensive vitiligo). Neurological:      General: No focal deficit present. Mental Status: He is alert and oriented to person, place, and time. Cranial Nerves: No cranial nerve deficit. Sensory: No sensory deficit. Motor: No weakness. Coordination: Coordination normal.      Gait: Gait normal.      Deep Tendon Reflexes: Reflexes normal.   Psychiatric:         Mood and Affect: Mood normal.         Behavior: Behavior normal.         Thought Content:  Thought content normal.         Judgment: Judgment normal.          Shala Murray MD  [address] [place] [address]

## 2024-12-04 ENCOUNTER — RA CDI HCC (OUTPATIENT)
Dept: OTHER | Facility: HOSPITAL | Age: 59
End: 2024-12-04

## 2024-12-05 ENCOUNTER — OFFICE VISIT (OUTPATIENT)
Dept: FAMILY MEDICINE CLINIC | Facility: CLINIC | Age: 59
End: 2024-12-05
Payer: COMMERCIAL

## 2024-12-05 VITALS
BODY MASS INDEX: 20.51 KG/M2 | SYSTOLIC BLOOD PRESSURE: 110 MMHG | HEIGHT: 75 IN | DIASTOLIC BLOOD PRESSURE: 88 MMHG | WEIGHT: 165 LBS

## 2024-12-05 DIAGNOSIS — Z00.00 WELLNESS EXAMINATION: Primary | ICD-10-CM

## 2024-12-05 DIAGNOSIS — C20 RECTAL CARCINOMA (HCC): ICD-10-CM

## 2024-12-05 DIAGNOSIS — N52.9 VASCULOGENIC ERECTILE DYSFUNCTION, UNSPECIFIED VASCULOGENIC ERECTILE DYSFUNCTION TYPE: ICD-10-CM

## 2024-12-05 DIAGNOSIS — Z23 ENCOUNTER FOR IMMUNIZATION: ICD-10-CM

## 2024-12-05 DIAGNOSIS — Z72.0 TOBACCO ABUSE: Chronic | ICD-10-CM

## 2024-12-05 PROCEDURE — 90471 IMMUNIZATION ADMIN: CPT | Performed by: FAMILY MEDICINE

## 2024-12-05 PROCEDURE — 90673 RIV3 VACCINE NO PRESERV IM: CPT | Performed by: FAMILY MEDICINE

## 2024-12-05 PROCEDURE — 99396 PREV VISIT EST AGE 40-64: CPT | Performed by: FAMILY MEDICINE

## 2024-12-05 RX ORDER — SILDENAFIL 50 MG/1
50 TABLET, FILM COATED ORAL DAILY PRN
Qty: 10 TABLET | Refills: 5 | Status: SHIPPED | OUTPATIENT
Start: 2024-12-05

## 2024-12-05 RX ORDER — IBUPROFEN 800 MG/1
800 TABLET, FILM COATED ORAL EVERY 6 HOURS PRN
COMMUNITY
Start: 2023-12-20 | End: 2024-12-19

## 2024-12-05 NOTE — PROGRESS NOTES
Adult Annual Physical  Name: Tristin Rowell      : 1965      MRN: [id number]  Encounter Provider: Francisco Meier MD  Encounter Date: 2024   Encounter department: [facility] PRIMARY CARE    Assessment & Plan  Encounter for immunization    Orders:    influenza vaccine, recombinant, PF, 0.5 mL IM (Flublok)    Wellness examination    Orders:    Renal function panel; Future    Rectal carcinoma (HCC)  Has been doing well and does continue overall follow-up       Vasculogenic erectile dysfunction, unspecified vasculogenic erectile dysfunction type  Gave refill on the generic Viagra  Orders:    sildenafil (VIAGRA) 50 MG tablet; Take 1 tablet (50 mg total) by mouth daily as needed for erectile dysfunction    Tobacco abuse  Still smoking about 1 pack a day and counseled to stop       Immunizations and preventive care screenings were discussed with patient today. Appropriate education was printed on patient's after visit summary.        Counseling:  Dental Health: discussed importance of regular tooth brushing, flossing, and dental visits.  Exercise: the importance of regular exercise/physical activity was discussed. Recommend exercise 3-5 times per week for at least 30 minutes.       Depression Screening and Follow-up Plan: Patient was screened for depression during today's encounter. They screened negative with a PHQ-2 score of 0.    Tobacco Cessation Counseling: Tobacco cessation counseling was provided. The patient is sincerely urged to quit consumption of tobacco. He is not ready to quit tobacco. Medication options not discussed.         History of Present Illness     Adult Annual Physical:  Patient presents for annual physical.     Diet and Physical Activity:  - Diet/Nutrition: well balanced diet.  - Exercise: no formal exercise.    Depression Screening:  - PHQ-2 Score: 0    General Health:  - Sleep: sleeps well.  - Hearing: decreased hearing bilateral ears.  - Vision: most  "recent eye exam > 1 year ago.  - Dental: no dental visits for > 1 year. Teeth are broken or missing and does show a lot of dental caries     Health:  - History of STDs: no.   - Urinary symptoms: erectile dysfunction.     Advanced Care Planning:  - Has an advanced directive?: no    - Has a durable medical POA?: no    - ACP document given to patient?: no      Review of Systems   Constitutional:  Negative for activity change, appetite change, chills, fatigue, fever and unexpected weight change.   HENT:  Positive for dental problem. Negative for congestion, rhinorrhea and trouble swallowing.    Eyes: Negative.  Negative for visual disturbance.   Respiratory:  Negative for apnea, cough, chest tightness and shortness of breath.    Cardiovascular:  Negative for chest pain, palpitations and leg swelling.   Gastrointestinal:  Negative for abdominal distention, abdominal pain, constipation and diarrhea.   Endocrine: Negative for polyuria (Nocturia x 1).   Genitourinary:  Negative for difficulty urinating.   Musculoskeletal:  Negative for arthralgias and myalgias.   Skin:  Negative for color change and rash.   Allergic/Immunologic: Negative for environmental allergies.   Neurological:  Negative for dizziness, weakness, light-headedness, numbness and headaches.   Hematological:  Negative for adenopathy.   Psychiatric/Behavioral:  Negative for agitation and sleep disturbance.    All other systems reviewed and are negative.        Objective   Blood Pressure 110/88 (BP Location: Left arm, Patient Position: Sitting, Cuff Size: Standard)   Height 6' 3\" (1.905 m)   Weight 74.8 kg (165 lb)   Body Mass Index 20.62 kg/m²     Physical Exam  Vitals and nursing note reviewed.   Constitutional:       Appearance: Normal appearance.   HENT:      Head: Normocephalic.      Right Ear: Tympanic membrane, ear canal and external ear normal. Decreased hearing (25 dB hearing screen off at 4000 Hz.  No difficulty with conversation) noted.      " Left Ear: Tympanic membrane, ear canal and external ear normal. Decreased hearing (He 5 dB hearing screen off at 4000 Hz) noted.      Nose: Nose normal.      Mouth/Throat:      Mouth: Mucous membranes are moist.      Dentition: Abnormal dentition (Multiple missing teeth and broken teeth). Dental caries present.   Eyes:      Extraocular Movements: Extraocular movements intact.      Conjunctiva/sclera: Conjunctivae normal.      Pupils: Pupils are equal, round, and reactive to light.   Neck:      Vascular: No carotid bruit.   Cardiovascular:      Rate and Rhythm: Normal rate and regular rhythm.      Pulses:           Dorsalis pedis pulses are 1+ on the left side.        Posterior tibial pulses are 1+ on the right side and 1+ on the left side.      Heart sounds: Normal heart sounds. No murmur (Rate is 84) heard.  Pulmonary:      Effort: Pulmonary effort is normal.      Breath sounds: Normal breath sounds.   Abdominal:      General: Abdomen is flat. There is no distension.      Palpations: Abdomen is soft. There is no mass.      Tenderness: There is no abdominal tenderness.      Hernia: There is no hernia in the left inguinal area or right inguinal area.   Genitourinary:     Penis: Normal and circumcised.       Testes: Normal.   Musculoskeletal:         General: No swelling.      Cervical back: Normal range of motion and neck supple. No tenderness. No muscular tenderness.      Right lower leg: No edema.      Left lower leg: No edema.      Right foot: No deformity.      Left foot: No deformity.   Feet:      Right foot:      Protective Sensation: 8 sites tested.  8 sites sensed.      Skin integrity: Skin integrity normal.      Left foot:      Protective Sensation: 8 sites tested.  8 sites sensed.   Lymphadenopathy:      Cervical: No cervical adenopathy.   Skin:     General: Skin is warm and dry.      Capillary Refill: Capillary refill takes 2 to 3 seconds.      Findings: No rash.   Neurological:      General: No focal  deficit present.      Mental Status: He is alert and oriented to person, place, and time.      Cranial Nerves: No cranial nerve deficit.      Sensory: No sensory deficit.      Motor: No weakness.      Coordination: Coordination normal.      Gait: Gait normal.      Deep Tendon Reflexes: Reflexes normal.   Psychiatric:         Mood and Affect: Mood normal.         Behavior: Behavior normal.         Thought Content: Thought content normal.

## 2024-12-05 NOTE — ASSESSMENT & PLAN NOTE
Gave refill on the generic Viagra  Orders:    sildenafil (VIAGRA) 50 MG tablet; Take 1 tablet (50 mg total) by mouth daily as needed for erectile dysfunction

## 2024-12-05 NOTE — PATIENT INSTRUCTIONS
Overall exam today looks good.  Continue follow-up for the rectal carcinoma.  Should do renal panel for wellness check but should wait till 2/5/2025 or after.  Try to get some daily physical activity.  Gave flu shot today.  Should follow-up for dental care